# Patient Record
(demographics unavailable — no encounter records)

---

## 2025-05-08 NOTE — DISCUSSION/SUMMARY
[FreeTextEntry1] : Patient is a 49yo F with PAF, ectopy, SVT, NSVT, HTN here for cardiac follow up.  PAF -Admitted [facility] 5/2025 with rapid AF requiring BERTHA/DCCV -Has not tolerated beta blockers in past or higher dose amlodipine, recently put on metoprolol however due to rapid AF. Quite symptomatic now however due to high dose metop and cardizem, does not need as aggressive AVN blocking and will cut back to metoprolol succinate only -Echo shows normal biV function, does have LAE -Stress test with good functional capacity and negative for ischemia 2020 -[id number] = 2  NSVT -Brief runs on MCOT in 2024, normal LV function and no ischemia on prior nuke. Given LAE, recent rapid AF and NSVT, will arrange cardiac MRI to evaluate for CMP   1. Cardiac MRI and referral to EP 2. Continue Eliquis  , dc cardizem and change beta blocker to Metoprolol ER 50mg daily 3. 30 day MCOT 4. Will consider AAT if recurrent symtpoms 5. Follow up 6 weeks  [EKG obtained to assist in diagnosis and management of assessed problem(s)] : EKG obtained to assist in diagnosis and management of assessed problem(s)

## 2025-05-08 NOTE — ASSESSMENT
[FreeTextEntry1] : ECG: SR, iRBBB    HDL 54  TG 60 (10/2020)   L 4.4  CO2 25 BUN 13 Creat 0.89  Mg 2.1(5/2025)  TSH 1.95 (5/2025)  Hgb 13.9 (5/2025)  A1C 5.7 (5/2025)  Serum metaneprhines normal range   BERTHA 5/2025: EF 60-65%, no LA/PAWAN thrombus, trivial MR, trivial pericardial effusion, negative bubble study ECHO 9/2024: Normal LV/RV function, EF 60-65%, moderate LAE, mild MR, trivial AI  30 day MCOT 8/2024: Short runs NSVT, 1% APC/PVC burden   ECHO 11/2020: 1. Normal LV size, systolic and diastolic function. EF 55-60% 2. Normal RV 3. Mild LAE 4. Trivial MR   EST 10/2020: 1. Negative for ischemia 2. Normal BP response  3. Achieved 11 minutes, 12 METS 4. DTS 11

## 2025-06-02 NOTE — REASON FOR VISIT
[Symptom and Test Evaluation] : symptom and test evaluation [Arrhythmia/ECG Abnorrmalities] : arrhythmia/ECG abnormalities [FreeTextEntry1] : 50-year-old female here to discuss atrial fibrillation

## 2025-06-02 NOTE — HISTORY OF PRESENT ILLNESS
[FreeTextEntry1] : 51yo F with PAF, ectopy, SVT, NSVT, HTN here for cardiac follow up. Recently admitted to [facility] due to symptomatic rapid AF. Did not response to Ibutilide, was on Cardizem gtt and remained difficult to control so underwent BERTHA/DCCV. Put on metoprolol, cardizem and Eliquis. Feeling tired/exhausted since, dizzy. No syncope/CP/SOB. Does not feel AF come back but notes some anxiety.  She is telling me she had 4 significant atrial fibrillation events in her life.  The first 1 was when she was about 30 where she approached the hospital with severe dizziness and palpitations and found to be in atrial fibrillation.  She self reverted to sinus.  The second time was about 15 years ago, similar circumstances and self terminated.  Third time was about 4 years ago where she had abrupt onset of palpitations and dizziness which were quite disturbing and had to go to the hospital and has she was about to be electrically cardioverted she self reverted to sinus.  The fourth episode was recently as above in [facility] where she failed ibutilide and ended up undergoing a cardioversion.  She was then placed on metoprolol and Cardizem but experienced fatigue weakness and bradycardia.  Cardizem was stopped and dose of metoprolol adjusted to 50 mg daily.  She does report occasional palpitations and galloping sensation.  She is quite physically active but has decreased levels of energy with the metoprolol and notices her heart rate does not go over 90 bpm with exercise where prior to that it would go up to 120.  Her [id number] is 2 for hypertension and female gender. Her EKG today shows sinus rhythm at 52 bpm and normal intervals. She is planned to have her MRI done at the end of June, ordered by Dr. Gonsalez for short NSVT's and atrial fibrillation in a young patient.  cardiology [facility]: BERTHA 5/2025: EF 60-65%, no LA/PAWAN thrombus, trivial MR, trivial pericardial effusion, negative bubble study. LA mod dilated. ECHO 9/2024: Normal LV/RV function, EF 60-65%, moderate LAE, mild MR, trivial AI  30 day MCOT 8/2024: Short runs NSVT, 1% APC/PVC burden, potentially some undetected AF episodes. Prior ECGs demonstrate AF (i.e 5/2/25) ECHO 11/2020: 1. Normal LV size, systolic and diastolic function. EF 55-60% 2. Normal RV 3. Mild LAE 4. Trivial MR  EST 10/2020: 1. Negative for ischemia 2. Normal BP response 3. Achieved 11 minutes, 12 METS 4. DTS 11.

## 2025-06-02 NOTE — HISTORY OF PRESENT ILLNESS
[FreeTextEntry1] : 51yo F with PAF, ectopy, SVT, NSVT, HTN here for cardiac follow up. Recently admitted to [facility] due to symptomatic rapid AF. Did not response to Ibutilide, was on Cardizem gtt and remained difficult to control so underwent BERTHA/DCCV. Put on metoprolol, cardizem and Eliquis. Feeling tired/exhausted since, dizzy. No syncope/CP/SOB. Does not feel AF come back but notes some anxiety.  She is telling me she had 4 significant atrial fibrillation events in her life.  The first 1 was when she was about 30 where she approached the hospital with severe dizziness and palpitations and found to be in atrial fibrillation.  She self reverted to sinus.  The second time was about 15 years ago, similar circumstances and self terminated.  Third time was about 4 years ago where she had abrupt onset of palpitations and dizziness which were quite disturbing and had to go to the hospital and has she was about to be electrically cardioverted she self reverted to sinus.  The fourth episode was recently as above in [facility] where she failed ibutilide and ended up undergoing a cardioversion.  She was then placed on metoprolol and Cardizem but experienced fatigue weakness and bradycardia.  Cardizem was stopped and dose of metoprolol adjusted to 50 mg daily.  She does report occasional palpitations and galloping sensation.  She is quite physically active but has decreased levels of energy with the metoprolol and notices her heart rate does not go over 90 bpm with exercise where prior to that it would go up to 120.  Her [id number] is 2 for hypertension and female gender. Her EKG today shows sinus rhythm at 52 bpm and normal intervals. She is planned to have her MRI done at the end of June, ordered by Dr. Gonsalez for short NSVT's and atrial fibrillation in a young patient.  cardiology [facility]: BERTHA 5/2025: EF 60-65%, no LA/PAWAN thrombus, trivial MR, trivial pericardial effusion, negative bubble study. LA mod dilated. ECHO 9/2024: Normal LV/RV function, EF 60-65%, moderate LAE, mild MR, trivial AI  30 day MCOT 8/2024: Short runs NSVT, 1% APC/PVC burden, potentially some undetected AF episodes. Prior ECGs demonstrate AF (i.e 5/2/25) ECHO 11/2020: 1. Normal LV size, systolic and diastolic function. EF 55-60% 2. Normal RV 3. Mild LAE 4. Trivial MR  EST 10/2020: 1. Negative for ischemia 2. Normal BP response 3. Achieved 11 minutes, 12 METS 4. DTS 11.

## 2025-06-02 NOTE — DISCUSSION/SUMMARY
[EKG obtained to assist in diagnosis and management of assessed problem(s)] : EKG obtained to assist in diagnosis and management of assessed problem(s) [FreeTextEntry1] :  - We discussed the pathophysiology of atrial fibrillation and multiple management options. These include rate Vs rhythm control strategies. - Given the associated symptoms despite attempts at a rate control strategy/ reduced EF, I would recommend that efforts be directed to restoring and maintaining sinus rhythm. -For rhythm control, options include catheter ablation or medications, each with its own benefits and risks, which were reviewed in detail.   - Options include: a) antiarrhythmic medication b) ablation of atrial fibrillation   - We reviewed in detail the potential risks and benefits of ablative therapy for atrial fibrillation (including pulmonary vein isolation). - For paroxysmal atrial fibrillation, the success rate of a single procedure for achieving durable sinus rhythm is 60-70%, and some patients have a better response to antiarrhythmic therapy after ablation. Success rates are often higher after a second or even third procedure. - The overall risk of any complications, including minor issues like groin hematoma not requiring intervention, is 2-3%. Serious complications like vascular injury requiring intervention are less frequent, and life-changing or potentially life-threatening complications such as cardiac perforation and tamponade, phrenic nerve or esophageal injury, stroke, or heart attack occur in less than 1% of patients. The use of newer pulsed-field ablation (PFA) technology likely reduces the risk of phrenic or esophageal injury even further, with shorter procedure times on average. - The overall risk is 2-3%, including the same risks as for flutter ablation, but a slightly higher risk of stroke and cardiac perforation and a small additional risk of serious injury such as phrenic nerve palsy or atrio-esophageal fistula.   After extensive discussion, the patient would like to proceed with an ablation. We therefore agreed on the following plan: - We will cut down her metoprolol to 25 since she has been symptomatic with it and if she does go into atrial fibrillation with RVR we will do a pill in the pocket approach where she would call in and get instructions regarding further metoprolol dosing to take at that time  -she is wearing an 30-day M cot will she will return next week and we will get further insight into rate and burden - We will schedule her for an ablation

## 2025-06-02 NOTE — HISTORY OF PRESENT ILLNESS
[FreeTextEntry1] : 51yo F with PAF, ectopy, SVT, NSVT, HTN here for cardiac follow up. Recently admitted to [facility] due to symptomatic rapid AF. Did not response to Ibutilide, was on Cardizem gtt and remained difficult to control so underwent BERTHA/DCCV. Put on metoprolol, cardizem and Eliquis. Feeling tired/exhausted since, dizzy. No syncope/CP/SOB. Does not feel AF come back but notes some anxiety.  She is telling me she had 4 significant atrial fibrillation events in her life.  The first 1 was when she was about 30 where she approached the hospital with severe dizziness and palpitations and found to be in atrial fibrillation.  She self reverted to sinus.  The second time was about 15 years ago, similar circumstances and self terminated.  Third time was about 4 years ago where she had abrupt onset of palpitations and dizziness which were quite disturbing and had to go to the hospital and has she was about to be electrically cardioverted she self reverted to sinus.  The fourth episode was recently as above in [facility] where she failed ibutilide and ended up undergoing a cardioversion.  She was then placed on metoprolol and Cardizem but experienced fatigue weakness and bradycardia.  Cardizem was stopped and dose of metoprolol adjusted to 50 mg daily.  She does report occasional palpitations and galloping sensation.  She is quite physically active but has decreased levels of energy with the metoprolol and notices her heart rate does not go over 90 bpm with exercise where prior to that it would go up to 120.  Her [id number] is 2 for hypertension and female gender. Her EKG today shows sinus rhythm at 52 bpm and normal intervals. She is planned to have her MRI done at the end of June, ordered by Dr. Gonsalez for short NSVT's and atrial fibrillation in a young patient.  cardiology [facility]: BERTHA 5/2025: EF 60-65%, no LA/PAWAN thrombus, trivial MR, trivial pericardial effusion, negative bubble study. LA mod dilated. ECHO 9/2024: Normal LV/RV function, EF 60-65%, moderate LAE, mild MR, trivial AI  30 day MCOT 8/2024: Short runs NSVT, 1% APC/PVC burden, potentially some undetected AF episodes. Prior ECGs demonstrate AF (i.e 5/2/25) ECHO 11/2020: 1. Normal LV size, systolic and diastolic function. EF 55-60% 2. Normal RV 3. Mild LAE 4. Trivial MR  EST 10/2020: 1. Negative for ischemia 2. Normal BP response 3. Achieved 11 minutes, 12 METS 4. DTS 11.

## 2025-07-01 NOTE — ASSESSMENT
[FreeTextEntry1] : ECG: SR, RSR'   HDL 54  TG 60 (10/2020)   L 4.4  CO2 25 BUN 13 Creat 0.89  Mg 2.1(5/2025)  TSH 1.95 (5/2025)  Hgb 13.9 (5/2025)  A1C 5.7 (5/2025)  Serum metaneprhines normal range   BERTHA 5/2025: EF 60-65%, no LA/PAWAN thrombus, trivial MR, trivial pericardial effusion, negative bubble study ECHO 9/2024: Normal LV/RV function, EF 60-65%, moderate LAE, mild MR, trivial AI  30 day MCOT 8/2024: Short runs NSVT, 1% APC/PVC burden   ECHO 11/2020: 1. Normal LV size, systolic and diastolic function. EF 55-60% 2. Normal RV 3. Mild LAE 4. Trivial MR   EST 10/2020: 1. Negative for ischemia 2. Normal BP response  3. Achieved 11 minutes, 12 METS 4. DTS 11

## 2025-07-01 NOTE — HISTORY OF PRESENT ILLNESS
[FreeTextEntry1] : Patient is a 51yo F with PAF, ectopy, SVT, NSVT, HTN here for cardiac follow up. admitted to [facility] due to symptomatic rapid AF. Did not response to Ibutilide, was on Cardizem gtt and remained difficult to control so underwent BERTHA/DCCV. Put on metoprolol, cardizem and Eliquis. Taken off cardizem and on meto/eliquis now only. Dose changed to tartrate 25mg bid, hobbs on succinate and less side effects. Taking 50mg daily and tolerating.   Feeling tired/exhausted since, dizzy. No syncope/CP/SOB. DOes not feel AF come back but notes some anxiety.   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  with 2 daughters, one graduated in 2023 and youngest starting college now.   ROS: GI and  negative

## 2025-07-01 NOTE — DISCUSSION/SUMMARY
[EKG obtained to assist in diagnosis and management of assessed problem(s)] : EKG obtained to assist in diagnosis and management of assessed problem(s) [FreeTextEntry1] : Patient is a 49yo F with PAF, ectopy, SVT, NSVT, HTN here for cardiac follow up.  PAF -Admitted [facility] 5/2025 with rapid AF requiring BERTHA/DCCV -Has not tolerated beta blockers in past or higher dose amlodipine, recently put on metoprolol however due to rapid AF. Quite symptomatic now however due to high dose metop and cardizem, does not need as aggressive AVN blocking and cut back to metoprolol succinate only -Echo shows normal biV function, does have LAE -Stress test with good functional capacity and negative for ischemia 2020 -[id number] = 2 -Seen by EP , plan for ablation with Dr Goodman   NSVT -Brief runs on MCOT in 2024, normal LV function and no ischemia on prior nuke. Given LAE, recent rapid AF and NSVT, will arrange cardiac MRI to evaluate for CMP -No recurrent AF on recent MCOT, brief NSVT again   1. EP follow up for ablation of AF, cardiac MRI results still pending due to NSVT 2. Changing to Pradaxa from Eliquis for cost reasons, only few left of eliquis, on Metoprolol ER 50mg daily 3. Follow up 3months  4. Some daytime fatigue and concern for sleep apnea with PAF.NSVT, will arrange home sleep study 5. Counselled on increasing exericse, adding couple days of light resistence training as well